# Patient Record
Sex: FEMALE | Race: WHITE | URBAN - METROPOLITAN AREA
[De-identification: names, ages, dates, MRNs, and addresses within clinical notes are randomized per-mention and may not be internally consistent; named-entity substitution may affect disease eponyms.]

---

## 2017-02-01 ENCOUNTER — TELEPHONE (OUTPATIENT)
Dept: FAMILY MEDICINE CLINIC | Age: 47
End: 2017-02-01

## 2017-02-01 ENCOUNTER — OFFICE VISIT (OUTPATIENT)
Dept: FAMILY MEDICINE CLINIC | Age: 47
End: 2017-02-01

## 2017-02-01 VITALS
SYSTOLIC BLOOD PRESSURE: 138 MMHG | TEMPERATURE: 98.2 F | DIASTOLIC BLOOD PRESSURE: 94 MMHG | BODY MASS INDEX: 23.74 KG/M2 | HEIGHT: 63 IN | HEART RATE: 87 BPM | RESPIRATION RATE: 16 BRPM | OXYGEN SATURATION: 99 % | WEIGHT: 134 LBS

## 2017-02-01 DIAGNOSIS — I10 ESSENTIAL HYPERTENSION: ICD-10-CM

## 2017-02-01 DIAGNOSIS — E87.1 HYPONATREMIA: ICD-10-CM

## 2017-02-01 DIAGNOSIS — R60.9 PERIPHERAL EDEMA: ICD-10-CM

## 2017-02-01 DIAGNOSIS — D50.8 OTHER IRON DEFICIENCY ANEMIA: ICD-10-CM

## 2017-02-01 DIAGNOSIS — K70.30 ALCOHOLIC CIRRHOSIS OF LIVER WITHOUT ASCITES (HCC): ICD-10-CM

## 2017-02-01 RX ORDER — BUMETANIDE 1 MG/1
1 TABLET ORAL DAILY
Qty: 30 TAB | Refills: 3 | Status: SHIPPED | OUTPATIENT
Start: 2017-02-01 | End: 2017-06-11 | Stop reason: SDUPTHER

## 2017-02-01 RX ORDER — FOLIC ACID 1 MG/1
1 TABLET ORAL DAILY
Qty: 30 TAB | Refills: 6 | Status: SHIPPED | OUTPATIENT
Start: 2017-02-01

## 2017-02-01 RX ORDER — FOLIC ACID 1 MG/1
TABLET ORAL DAILY
COMMUNITY
End: 2017-02-01 | Stop reason: SDUPTHER

## 2017-02-01 RX ORDER — METOPROLOL TARTRATE 50 MG/1
TABLET ORAL 2 TIMES DAILY
COMMUNITY
End: 2017-02-01 | Stop reason: SDUPTHER

## 2017-02-01 RX ORDER — LANOLIN ALCOHOL/MO/W.PET/CERES
1000 CREAM (GRAM) TOPICAL DAILY
Qty: 30 TAB | Refills: 6 | Status: SHIPPED | OUTPATIENT
Start: 2017-02-01

## 2017-02-01 RX ORDER — LISINOPRIL 20 MG/1
20 TABLET ORAL DAILY
Qty: 30 TAB | Refills: 4 | Status: SHIPPED | OUTPATIENT
Start: 2017-02-01 | End: 2017-02-08

## 2017-02-01 RX ORDER — LANCETS
EACH MISCELLANEOUS
Qty: 400 EACH | Refills: 11 | Status: SHIPPED | OUTPATIENT
Start: 2017-02-01 | End: 2017-05-16 | Stop reason: SDUPTHER

## 2017-02-01 RX ORDER — METOPROLOL TARTRATE 50 MG/1
50 TABLET ORAL 2 TIMES DAILY
Qty: 60 TAB | Refills: 6 | Status: SHIPPED | OUTPATIENT
Start: 2017-02-01 | End: 2017-02-08 | Stop reason: ALTCHOICE

## 2017-02-01 RX ORDER — LANOLIN ALCOHOL/MO/W.PET/CERES
CREAM (GRAM) TOPICAL
COMMUNITY

## 2017-02-01 RX ORDER — BUMETANIDE 1 MG/1
TABLET ORAL DAILY
COMMUNITY
End: 2017-02-01 | Stop reason: SDUPTHER

## 2017-02-01 RX ORDER — INSULIN PUMP SYRINGE, 3 ML
EACH MISCELLANEOUS
Qty: 1 KIT | Refills: 0 | Status: SHIPPED | OUTPATIENT
Start: 2017-02-01 | End: 2017-02-03 | Stop reason: SDUPTHER

## 2017-02-01 RX ORDER — ACETAMINOPHEN 160 MG/5ML
SUSPENSION, ORAL (FINAL DOSE FORM) ORAL
Qty: 100 SYRINGE | Refills: 3 | Status: SHIPPED | OUTPATIENT
Start: 2017-02-01 | End: 2017-05-16 | Stop reason: SDUPTHER

## 2017-02-01 NOTE — TELEPHONE ENCOUNTER
The patient is calling to request a blood glucose monitor and the test strips so she can check her blood sugars; the patient has [employer]; the patient states they were checking her blood sugars while she was in the hospital; the patient states she uses [facility] [address]; the best contact number for the patient is [phone]; thank you

## 2017-02-01 NOTE — PROGRESS NOTES
1. Have you been to the ER, urgent care clinic since your last visit? Hospitalized since your last visit? No    2. Have you seen or consulted any other health care providers outside of the [address] [place] [address] since your last visit? Include any pap smears or colon screening.  No     Chief Complaint   Patient presents with    Establish Care    Medication Refill    Diabetes    CHF    Anemia    Referral Request

## 2017-02-01 NOTE — PROGRESS NOTES
Chief Complaint   Patient presents with    Establish Care    Medication Refill    Diabetes    CHF    Anemia    Referral Request     she is a 52y.o. year old female who presents for evalution. She was released from [facility] jh 15 2017  She went in for a cut that got infected with MRSA. She went back in for SOB a few days later and she at that time had pneumonia. While in the hospital she was told that she has CHF  Her hgb at that time was also 6.7. She was seen by hematology and the hgb had increased so he decided she did not need a  Big workup  She has  Been drinking heavy etoh but stopped in December. She was told the liver looks mildly cirrhotic. This is possibly the cause for the anemia  She is taking iron but not taking it three times a day    Reviewed PmHx, RxHx, FmHx, SocHx, AllgHx and updated and dated in the chart.     Patient Active Problem List    Diagnosis    Peripheral neuropathy ([facility]    Essential hypertension    Type II diabetes mellitus, uncontrolled ([facility]       Nurse notes were reviewed and copied and are correct  Review of Systems - negative except as listed above in the HPI    Objective:     Vitals:    02/01/17 1339   BP: (!) 138/94   Pulse: 87   Resp: 16   Temp: 98.2 °F (36.8 °C)   TempSrc: Oral   SpO2: 99%   Weight: 134 lb (60.8 kg)   Height: 5' 3\" (1.6 m)   Physical Examination: General appearance - alert, well appearing, and in no distress and oriented to person, place, and time  Mental status - alert, oriented to person, place, and time  Mouth - mucous membranes moist, pharynx normal without lesions  Neck - supple, no significant adenopathy  Lymphatics - no palpable lymphadenopathy, no hepatosplenomegaly  Chest - clear to auscultation, no wheezes, rales or rhonchi, symmetric air entry  Heart - normal rate, regular rhythm, normal S1, S2, no murmurs, rubs, clicks or gallops  Abdomen - soft,nondistended, no masses or organomegaly  Neurological - alert, oriented, normal speech, no focal findings or movement disorder noted  Musculoskeletal - no joint tenderness, deformity or swelling  Extremities - peripheral pulses normal, no pedal edema, no clubbing or cyanosis  Skin - normal coloration and turgor, no rashes, no suspicious skin lesions noted      Assessment/ Plan:   Jennie Garcia was seen today for establish care, medication refill, diabetes, chf, anemia and referral request.    Diagnoses and all orders for this visit:    Uncontrolled type 2 diabetes mellitus with other specified complication, with long-term current use of insulin (HCC)  -     Insulin Syringe-Needle U-100 0.3 mL 30 gauge x 1/2\" syrg; Use as directed    Other iron deficiency anemia  -     CBC WITH AUTOMATED DIFF  -     VITAMIN B12  -     FOLATE  -     REFERRAL TO GASTROENTEROLOGY  -     cyanocobalamin (VITAMIN B-12) 1,000 mcg tablet; Take 1 Tab by mouth daily. -     folic acid (FOLVITE) 1 mg tablet; Take 1 Tab by mouth daily. This could be from etoh causing a b12 deficiency or from bleeding in the stomach as a result of etoh. I am referring to GI to check her for bleeding n the GI track as well as follow  Her for the cirrhosis  Alcoholic cirrhosis of liver without ascites ([facility]  -     METABOLIC PANEL, COMPREHENSIVE  -     REFERRAL TO GASTROENTEROLOGY    Essential hypertension  -     lisinopril (PRINIVIL, ZESTRIL) 20 mg tablet; Take 1 Tab by mouth daily. Not well controlled. Recheck in 1 week  Peripheral edema  -     bumetanide (BUMEX) 1 mg tablet; Take 1 Tab by mouth daily. Follow-up Disposition:  Return in about 1 week (around 2/8/2017). ICD-10-CM ICD-9-CM    1. Uncontrolled type 2 diabetes mellitus with other specified complication, with long-term current use of insulin (HCC) E11.69  Insulin Syringe-Needle U-100 0.3 mL 30 gauge x 1/2\" syrg    E11.65      Z79.4     2.  Other iron deficiency anemia D50.8  CBC WITH AUTOMATED DIFF      VITAMIN B12      FOLATE      REFERRAL TO GASTROENTEROLOGY cyanocobalamin (VITAMIN B-12) 1,000 mcg tablet      folic acid (FOLVITE) 1 mg tablet   3. Alcoholic cirrhosis of liver without ascites (HCC) U94.63 769.7 METABOLIC PANEL, COMPREHENSIVE      REFERRAL TO GASTROENTEROLOGY   4. Essential hypertension I10 401.9 lisinopril (PRINIVIL, ZESTRIL) 20 mg tablet   5. Peripheral edema R60.9 782.3 bumetanide (BUMEX) 1 mg tablet       I have discussed the diagnosis with the patient and the intended plan as seen in the above orders. The patient has received an after-visit summary and questions were answered concerning future plans. Medication Side Effects and Warnings were discussed with patient: yes  Patient Labs were reviewed and or requested: yes  Patient Past Records were reviewed and or requested: yes        There are no Patient Instructions on file for this visit.     The patient verbalizes understanding and agrees with the plan of care        Patient has the advanced directives booklet to review

## 2017-02-01 NOTE — MR AVS SNAPSHOT
Visit Information Date & Time Provider Department Dept. Phone Encounter #  
 2/1/2017  1:30 PM Justin Braxton MD [address] [facility] [id number] Follow-up Instructions Return in about 1 week (around 2/8/2017). Your Appointments 2/13/2017  3:30 PM  
ROUTINE CARE with Sary Khna MD  
[facility]) Appt Note: fu DM  
 [address] [facility] [address]  
[phone]  
  
   
 [address] [facility] [address] Upcoming Health Maintenance Date Due  
 LIPID PANEL Q1 1970 FOOT EXAM Q1 1/2/1980 MICROALBUMIN Q1 1/2/1980 EYE EXAM RETINAL OR DILATED Q1 1/2/1980 Pneumococcal 19-64 Medium Risk (1 of 1 - PPSV23) 1/2/1989 DTaP/Tdap/Td series (1 - Tdap) 1/2/1991 PAP AKA CERVICAL CYTOLOGY 1/2/1991 HEMOGLOBIN A1C Q6M 6/7/2016 INFLUENZA AGE 9 TO ADULT 8/1/2016 Allergies as of 2/1/2017  Review Complete On: 2/1/2017 By: Justin Braxton MD  
  
 Severity Noted Reaction Type Reactions Other Food  12/07/2015    Swelling Roetta Player Sulfa (Sulfonamide Antibiotics)  05/27/2011    Hives Current Immunizations  Never Reviewed No immunizations on file. Not reviewed this visit You Were Diagnosed With   
  
 Codes Comments Uncontrolled type 2 diabetes mellitus with other specified complication, with long-term current use of insulin ([facility]    -  Primary ICD-10-CM: E11.69, E11.65, Z79.4 Other iron deficiency anemia     ICD-10-CM: D50.8 Alcoholic cirrhosis of liver without ascites (HCC)     ICD-10-CM: K70.30 ICD-9-CM: 571.2 Essential hypertension     ICD-10-CM: I10 
ICD-9-CM: 401.9 Peripheral edema     ICD-10-CM: R60.9 ICD-9-CM: 475. 3 Vitals BP Pulse Temp Resp Height(growth percentile) Weight(growth percentile)  (!) 138/94 (BP 1 Location: Right arm, BP Patient Position: Sitting) 87 98.2 °F (36.8 °C) (Oral) 16 5' 3\" (1.6 m) 134 lb (60.8 kg) SpO2 BMI OB Status Smoking Status 99% 23.74 kg/m2 Having regular periods Former Smoker BMI and BSA Data Body Mass Index Body Surface Area  
 23.74 kg/m 2 1.64 m 2 Preferred Pharmacy Pharmacy Name Phone [facility] #[id number]- [facility] [address] [facility] [phone] Your Updated Medication List  
  
   
This list is accurate as of: 2/1/17  2:34 PM.  Always use your most recent med list.  
  
  
  
  
 Blood-Glucose Meter monitoring kit Commonly known as:  FREESTYLE LITE METER Use to test blood glucose 4 times daily  
  
 bumetanide 1 mg tablet Commonly known as:  Barnie Pulse Take 1 Tab by mouth daily. CLARITIN 10 mg tablet Generic drug:  loratadine Take 10 mg by mouth daily. cyanocobalamin 1,000 mcg tablet Commonly known as:  VITAMIN B-12 Take 1 Tab by mouth daily. ferrous sulfate 325 mg (65 mg iron) tablet Take  by mouth Daily (before breakfast). folic acid 1 mg tablet Commonly known as:  Google Take 1 Tab by mouth daily. * glucose blood VI test strips strip Commonly known as:  ONETOUCH ULTRA TEST Test blood glucose 4 times daily * glucose blood VI test strips strip Commonly known as:  FREESTYLE LITE STRIPS Use to test blood glucose 4 times daily * HUMALOG SC  
by SubCUTAneous route. Use per SSI * insulin lispro 100 unit/mL kwikpen Commonly known as:  HUMALOG 4- 5 units before breakfast, 4-5 units before lunch and 4-5 units before dinner w/ SSI verbal received from Buddy Muniz MD to dispense * insulin lispro 100 unit/mL kwikpen Commonly known as:  HUMALOG Inject 4- 5  units before breakfast, 4-5 units before lunch and 4-5 units before dinner w/ SSI Max units daily: 30  
  
 * insulin lispro 100 unit/mL injection Commonly known as:  HumaLOG Inject 4- 5 units before breakfast, 4-5 units before lunch and 4-5 units before dinner. Max Daily Units: 30  
  
 * insulin lispro 100 unit/mL injection Commonly known as:  HumaLOG Inject 4- 5 units before breakfast, 4-5 units before lunch and 4-5 units before dinner. Max daily units: 30  
  
 * insulin lispro 100 unit/mL injection Commonly known as:  HumaLOG Inject 4- 5 units before breakfast, 4-5 units before lunch and 4-5 units before dinner. Max daily units: 30  
  
 insulin detemir 100 unit/mL (3 mL) Inpn Commonly known as:  Derek Socorro Inject 16 units at bed time Insulin Needles (Disposable) 32 gauge x 5/32\" Ndle Commonly known as:  Randee Pen Needle Use with humalog and Levemir Insulin Syringe-Needle U-100 0.3 mL 30 gauge x 1/2\" Syrg Use as directed * Lancets Misc Test blood glucose 4 times daily * Lancets Misc Commonly known as:  FREESTYLE LANCETS Use to test blood glucose 4 times daily  
  
 lisinopril 20 mg tablet Commonly known as:  Mollie Ripa Take 1 Tab by mouth daily. metoprolol tartrate 50 mg tablet Commonly known as:  LOPRESSOR Take  by mouth two (2) times a day. MULTIVITAMIN PO Take 1 Tab by mouth daily. OTHER  
2 Tabs two (2) times a day. Hista-Block/Blood Pressure * Notice: This list has 10 medication(s) that are the same as other medications prescribed for you. Read the directions carefully, and ask your doctor or other care provider to review them with you. Prescriptions Sent to Pharmacy Refills Insulin Syringe-Needle U-100 0.3 mL 30 gauge x 1/2\" syrg 3 Sig: Use as directed Class: Normal  
 Pharmacy: [facility]/pharmacy #[id number] [address] Ph #: [phone]  
 cyanocobalamin (VITAMIN B-12) 1,000 mcg tablet 6 Sig: Take 1 Tab by mouth daily. Class: Normal  
 Pharmacy: [address] Ph #: [phone]  Route: Oral  
 bumetanide (BUMEX) 1 mg tablet 3  
 Sig: Take 1 Tab by mouth daily. Class: Normal  
 Pharmacy: [address] [place] [address] Ph #: [phone] Route: Oral  
 folic acid (FOLVITE) 1 mg tablet 6 Sig: Take 1 Tab by mouth daily. Class: Normal  
 Pharmacy: [address] [place] [address] Ph #: [phone] Route: Oral  
 lisinopril (PRINIVIL, ZESTRIL) 20 mg tablet 4 Sig: Take 1 Tab by mouth daily. Class: Normal  
 Pharmacy: [address] [place] [address] Ph #: [phone] Route: Oral  
  
We Performed the Following CBC WITH AUTOMATED DIFF [76428 CPT(R)] FOLATE [id number] CPT(R)] METABOLIC PANEL, COMPREHENSIVE [12036 CPT(R)] REFERRAL TO GASTROENTEROLOGY [LDI08 Custom] Comments:  
 Recent severe anemia, needs GI eval for possible source of blood loss VITAMIN B12 [id number] CPT(R)] Follow-up Instructions Return in about 1 week (around 2/8/2017). Referral Information Referral ID Referred By Referred To  
  
 [id number] [facility], [facility] Phone: [phone] Fax: [phone] Visits Status Start Date End Date 1 New Request 2/1/17 2/1/18 If your referral has a status of pending review or denied, additional information will be sent to support the outcome of this decision. Introducing [facility]! Jovi Esquivel introduces [employer] patient portal. Now you can access parts of your medical record, email your doctor's office, and request medication refills online. 1. In your internet browser, go to [email or web address] 2. Click on the First Time User? Click Here link in the Sign In box. You will see the New Member Sign Up page. 3. Enter your [employer] Access Code exactly as it appears below. You will not need to use this code after youve completed the sign-up process.  If you do not sign up before the expiration date, you must request a new code. · [employer] Computer Access Code: [email or web address] Expires: 5/2/2017  2:34 PM 
 
4. Enter the last four digits of your Social Security Number (xxxx) and Date of Birth (mm/dd/yyyy) as indicated and click Submit. You will be taken to the next sign-up page. 5. Create a [employer] Computer ID. This will be your [employer] Computer login ID and cannot be changed, so think of one that is secure and easy to remember. 6. Create a [employer] Computer password. You can change your password at any time. 7. Enter your Password Reset Question and Answer. This can be used at a later time if you forget your password. 8. Enter your e-mail address. You will receive e-mail notification when new information is available in [address] 9. Click Sign Up. You can now view and download portions of your medical record. 10. Click the Download Summary menu link to download a portable copy of your medical information. If you have questions, please visit the Frequently Asked Questions section of the [employer] website. Remember, [employer] is NOT to be used for urgent needs. For medical emergencies, dial 911. Now available from your iPhone and Android! Please provide this summary of care documentation to your next provider. Your primary care clinician is listed as Nehemiah Greene. If you have any questions after today's visit, please call [phone].

## 2017-02-02 NOTE — TELEPHONE ENCOUNTER
Pt called at the number on file. Nurse informed the pt, the medication that was requested, was approved and sent to the pharmacy, pt verbalize understanding.

## 2017-02-02 NOTE — TELEPHONE ENCOUNTER
----- Message from Loida Ardon sent at 2/1/2017  5:11 PM EST -----  Regarding: Dr Usama Hernández  Pt's (p) [phone], regarding her rx  For her blood glucose strips that was supposed to be called into  [facility]  [phone],  She checking on the status

## 2017-02-03 LAB
ALBUMIN SERPL-MCNC: 4 G/DL (ref 3.5–5.5)
ALBUMIN/GLOB SERPL: 1.3 {RATIO} (ref 1.1–2.5)
ALP SERPL-CCNC: 119 IU/L (ref 39–117)
ALT SERPL-CCNC: 15 IU/L (ref 0–32)
AST SERPL-CCNC: 19 IU/L (ref 0–40)
BASOPHILS # BLD AUTO: 0.1 X10E3/UL (ref 0–0.2)
BASOPHILS NFR BLD AUTO: 1 %
BILIRUB SERPL-MCNC: 1.2 MG/DL (ref 0–1.2)
BUN SERPL-MCNC: 26 MG/DL (ref 6–24)
BUN/CREAT SERPL: 17 (ref 9–23)
CALCIUM SERPL-MCNC: 10.7 MG/DL (ref 8.7–10.2)
CHLORIDE SERPL-SCNC: 85 MMOL/L (ref 96–106)
CO2 SERPL-SCNC: 27 MMOL/L (ref 18–29)
CREAT SERPL-MCNC: 1.49 MG/DL (ref 0.57–1)
EOSINOPHIL # BLD AUTO: 0.1 X10E3/UL (ref 0–0.4)
EOSINOPHIL NFR BLD AUTO: 2 %
ERYTHROCYTE [DISTWIDTH] IN BLOOD BY AUTOMATED COUNT: 18 % (ref 12.3–15.4)
FOLATE SERPL-MCNC: 16.6 NG/ML
GLOBULIN SER CALC-MCNC: 3.1 G/DL (ref 1.5–4.5)
GLUCOSE SERPL-MCNC: 431 MG/DL (ref 65–99)
HCT VFR BLD AUTO: 40.7 % (ref 34–46.6)
HGB BLD-MCNC: 13 G/DL (ref 11.1–15.9)
IMM GRANULOCYTES # BLD: 0 X10E3/UL (ref 0–0.1)
IMM GRANULOCYTES NFR BLD: 0 %
INTERPRETATION: NORMAL
LYMPHOCYTES # BLD AUTO: 1.6 X10E3/UL (ref 0.7–3.1)
LYMPHOCYTES NFR BLD AUTO: 25 %
MCH RBC QN AUTO: 25.9 PG (ref 26.6–33)
MCHC RBC AUTO-ENTMCNC: 31.9 G/DL (ref 31.5–35.7)
MCV RBC AUTO: 81 FL (ref 79–97)
MONOCYTES # BLD AUTO: 0.5 X10E3/UL (ref 0.1–0.9)
MONOCYTES NFR BLD AUTO: 7 %
NEUTROPHILS # BLD AUTO: 4 X10E3/UL (ref 1.4–7)
NEUTROPHILS NFR BLD AUTO: 65 %
PLATELET # BLD AUTO: 310 X10E3/UL (ref 150–379)
POTASSIUM SERPL-SCNC: 4.9 MMOL/L (ref 3.5–5.2)
PROT SERPL-MCNC: 7.1 G/DL (ref 6–8.5)
RBC # BLD AUTO: 5.02 X10E6/UL (ref 3.77–5.28)
SODIUM SERPL-SCNC: 133 MMOL/L (ref 134–144)
VIT B12 SERPL-MCNC: 1014 PG/ML (ref 211–946)
WBC # BLD AUTO: 6.3 X10E3/UL (ref 3.4–10.8)

## 2017-02-04 RX ORDER — INSULIN PUMP SYRINGE, 3 ML
EACH MISCELLANEOUS
Qty: 1 KIT | Refills: 0 | Status: SHIPPED | OUTPATIENT
Start: 2017-02-03

## 2017-02-04 RX ORDER — LANCETS
EACH MISCELLANEOUS
Qty: 200 EACH | Refills: 11 | Status: SHIPPED | OUTPATIENT
Start: 2017-02-04 | End: 2017-02-13 | Stop reason: SDUPTHER

## 2017-02-06 ENCOUNTER — TELEPHONE (OUTPATIENT)
Dept: FAMILY MEDICINE CLINIC | Age: 47
End: 2017-02-06

## 2017-02-06 NOTE — PROGRESS NOTES
Spoke with patient and notified of lab results. Patient has an appt Wednesday 2/8/17 and will recheck then. Patient verbalized understanding and had no questions at this time.

## 2017-02-06 NOTE — PROGRESS NOTES
i need her to come back in today to recheck the BMP. The blood sugar was extremely high and because of the high sugar the sodium was low . I need to see where the number are now. This is very important. you need to come in right away to recheck

## 2017-02-06 NOTE — TELEPHONE ENCOUNTER
Patient at her pulmonary apt right now today 2.6.17 and she needs the referral in the system in order for me to get the PA

## 2017-02-08 ENCOUNTER — OFFICE VISIT (OUTPATIENT)
Dept: FAMILY MEDICINE CLINIC | Age: 47
End: 2017-02-08

## 2017-02-08 ENCOUNTER — TELEPHONE (OUTPATIENT)
Dept: FAMILY MEDICINE CLINIC | Age: 47
End: 2017-02-08

## 2017-02-08 VITALS
SYSTOLIC BLOOD PRESSURE: 147 MMHG | DIASTOLIC BLOOD PRESSURE: 89 MMHG | OXYGEN SATURATION: 97 % | BODY MASS INDEX: 23.74 KG/M2 | HEART RATE: 95 BPM | TEMPERATURE: 98 F | WEIGHT: 134 LBS | RESPIRATION RATE: 17 BRPM | HEIGHT: 63 IN

## 2017-02-08 DIAGNOSIS — I50.9 ACUTE ON CHRONIC CONGESTIVE HEART FAILURE, UNSPECIFIED CONGESTIVE HEART FAILURE TYPE: Primary | ICD-10-CM

## 2017-02-08 DIAGNOSIS — F41.9 ANXIETY: ICD-10-CM

## 2017-02-08 DIAGNOSIS — I10 ESSENTIAL HYPERTENSION: Primary | ICD-10-CM

## 2017-02-08 LAB — GLUCOSE POC: 409 MG/DL

## 2017-02-08 RX ORDER — BUSPIRONE HYDROCHLORIDE 5 MG/1
5 TABLET ORAL
Qty: 30 TAB | Refills: 0 | Status: SHIPPED | OUTPATIENT
Start: 2017-02-08

## 2017-02-08 RX ORDER — METOPROLOL TARTRATE 25 MG/1
12.5 TABLET, FILM COATED ORAL 2 TIMES DAILY
Qty: 30 TAB | Refills: 1 | Status: SHIPPED | OUTPATIENT
Start: 2017-02-08

## 2017-02-08 NOTE — TELEPHONE ENCOUNTER
The patient states she needs a referral to cardiology for congestive heart failure and pulmonary edema; the patient is scheduled to be seen tomorrow by Dr. Ophelia Waddell with [facility]; her appointment is at 2:15 pm tomorrow; if Dr. Jonel Carvalho will put the referral in the system, I will do the insurance referral; apparently this is a follow up from the hospital; thank you

## 2017-02-08 NOTE — PROGRESS NOTES
1. Have you been to the ER, urgent care clinic since your last visit? Hospitalized since your last visit? No    2. Have you seen or consulted any other health care providers outside of the [address] [place] [address] since your last visit? Include any pap smears or colon screening.  No

## 2017-02-08 NOTE — PROGRESS NOTES
No chief complaint on file. she is a 52y.o. year old female who presents for evalution. . She had very high blood sugar last week and sodium was low. She also has cirrhosis and taking diuretic  She never started the lisinopril  She was taking metoprolol but stopped because her bp was too low a few times and she felt bad  Reviewed PmHx, RxHx, FmHx, SocHx, AllgHx and updated and dated in the chart. Patient Active Problem List    Diagnosis    [facility]    Essential hypertension    Type II diabetes mellitus, uncontrolled ([facility]       Nurse notes were reviewed and copied and are correct  Review of Systems - negative except as listed above in the HPI    Objective:     Vitals:    02/08/17 1453   BP: 147/89   Pulse: 95   Resp: 17   Temp: 98 °F (36.7 °C)   TempSrc: Oral   SpO2: 97%   Weight: 134 lb (60.8 kg)   Height: 5' 3\" (1.6 m)        Physical Examination: General appearance - alert, well appearing, and in no distress and oriented to person, place, and time  Mental status - alert, oriented to person, place, and time, normal mood, behavior, speech, dress, motor activity, and thought processes  Lymphatics - no palpable lymphadenopathy, no hepatosplenomegaly  Chest - clear to auscultation, no wheezes, rales or rhonchi, symmetric air entry  Heart - normal rate, regular rhythm, normal S1, S2, no murmurs, rubs, clicks or gallops  Abdomen - soft, nontender, nondistended, no masses or organomegaly      Assessment/ Plan:   Diagnoses and all orders for this visit:    Essential hypertension  -     metoprolol tartrate (LOPRESSOR) 25 mg tablet; Take 0.5 Tabs by mouth two (2) times a day. Lowering the dose. She is c/o feeling lightheaded and feels better when not taking it. She has not taken any today. I am lowering it to 12.5 mg bid. Anxiety  -     busPIRone (BUSPAR) 5 mg tablet; Take 1 Tab by mouth nightly.     Uncontrolled type 2 diabetes mellitus with other specified complication (HCC)  -     AMB POC GLUCOSE BLOOD, BY GLUCOSE MONITORING DEVICE    Still at 400. She is drinking glucerna. Maybe this is causing the iincreased glucose. The other diet history sounds llike low carb. She will see endocrine Monday. Meanwhile move more in the house and outside a little as tolerated. Continue the insulin as prescribed. Recheck the BMP today and she will see cardiology tomorrow . She will let the cardiologist know about the test when she sees him   Follow-up Disposition:  Return in about 3 months (around 5/8/2017). ICD-10-CM ICD-9-CM    1. Essential hypertension I10 401.9 metoprolol tartrate (LOPRESSOR) 25 mg tablet   2. Anxiety F41.9 300.00 busPIRone (BUSPAR) 5 mg tablet   3. Uncontrolled type 2 diabetes mellitus with other specified complication (HCC) H42.36  AMB POC GLUCOSE BLOOD, BY GLUCOSE MONITORING DEVICE    E11.65         I have discussed the diagnosis with the patient and the intended plan as seen in the above orders. The patient has received an after-visit summary and questions were answered concerning future plans. Medication Side Effects and Warnings were discussed with patient: yes  Patient Labs were reviewed and or requested: yes  Patient Past Records were reviewed and or requested: yes        There are no Patient Instructions on file for this visit.     The patient verbalizes understanding and agrees with the plan of care        Patient has the advanced directives booklet to review

## 2017-02-08 NOTE — MR AVS SNAPSHOT
Visit Information Date & Time Provider Department Dept. Phone Encounter #  
 2/8/2017  2:30 PM Justin Braxton MD [address] [facility] [id number] Follow-up Instructions Return in about 3 months (around 5/8/2017). Your Appointments 2/13/2017  3:30 PM  
ROUTINE CARE with Sary Khan MD  
Care Diabetes & Endocrinology [facility]) Appt Note: fu DM  
 [address] [facility] [address]  
[phone] [address] [facility] [address] Upcoming Health Maintenance Date Due  
 LIPID PANEL Q1 1970 FOOT EXAM Q1 1/2/1980 MICROALBUMIN Q1 1/2/1980 EYE EXAM RETINAL OR DILATED Q1 1/2/1980 Pneumococcal 19-64 Medium Risk (1 of 1 - PPSV23) 1/2/1989 DTaP/Tdap/Td series (1 - Tdap) 1/2/1991 PAP AKA CERVICAL CYTOLOGY 1/2/1991 HEMOGLOBIN A1C Q6M 6/7/2016 INFLUENZA AGE 9 TO ADULT 8/1/2016 Allergies as of 2/8/2017  Review Complete On: 2/8/2017 By: Art Ulloa LPN Severity Noted Reaction Type Reactions Other Food  12/07/2015    Swelling Roetta Player Sulfa (Sulfonamide Antibiotics)  05/27/2011    Hives Current Immunizations  Never Reviewed No immunizations on file. Not reviewed this visit You Were Diagnosed With   
  
 Codes Comments Essential hypertension    -  Primary ICD-10-CM: I10 
ICD-9-CM: 401.9 Uncontrolled type 2 diabetes mellitus with other specified complication, without long-term current use of insulin (HCC)     ICD-10-CM: E11.69, E11.65 Vitals BP Pulse Temp Resp Height(growth percentile) Weight(growth percentile) 147/89 95 98 °F (36.7 °C) (Oral) 17 5' 3\" (1.6 m) 134 lb (60.8 kg) SpO2 BMI OB Status Smoking Status 97% 23.74 kg/m2 Having regular periods Former Smoker Vitals History BMI and BSA Data Body Mass Index Body Surface Area  
 23.74 kg/m 2 1.64 m 2 Preferred Pharmacy Pharmacy Name Phone [facility]/PH[facility]ACY #[id number] [phone] Your Updated Medication List  
  
   
This list is accurate as of: 2/8/17  3:19 PM.  Always use your most recent med list.  
  
  
  
  
 Blood-Glucose Meter monitoring kit Commonly known as:  FREESTYLE LITE METER Use to test blood glucose 4 times daily  
  
 bumetanide 1 mg tablet Commonly known as:  Mariela [place] Take 1 Tab by mouth daily. CLARITIN 10 mg tablet Generic drug:  loratadine Take 10 mg by mouth daily. cyanocobalamin 1,000 mcg tablet Commonly known as:  VITAMIN B-12 Take 1 Tab by mouth daily. ferrous sulfate 325 mg (65 mg iron) tablet Take  by mouth Daily (before breakfast). folic acid 1 mg tablet Commonly known as:  Google Take 1 Tab by mouth daily. * glucose blood VI test strips strip Commonly known as:  ONETOUCH ULTRA TEST Test blood glucose 4 times daily * glucose blood VI test strips strip Commonly known as:  FREESTYLE LITE STRIPS Use to test blood glucose 4 times daily * HUMALOG SC  
by SubCUTAneous route. Use per SSI * insulin lispro 100 unit/mL kwikpen Commonly known as:  HUMALOG 4- 5 units before breakfast, 4-5 units before lunch and 4-5 units before dinner w/ SSI verbal received from Thelma Johnson MD to dispense * insulin lispro 100 unit/mL kwikpen Commonly known as:  HUMALOG Inject 4- 5  units before breakfast, 4-5 units before lunch and 4-5 units before dinner w/ SSI Max units daily: 30  
  
 * insulin lispro 100 unit/mL injection Commonly known as:  HumaLOG Inject 4- 5 units before breakfast, 4-5 units before lunch and 4-5 units before dinner. Max Daily Units: 30  
  
 * insulin lispro 100 unit/mL injection Commonly known as:  HumaLOG Inject 4- 5 units before breakfast, 4-5 units before lunch and 4-5 units before dinner. Max daily units: 30  
  
 * insulin lispro 100 unit/mL injection Commonly known as:  HumaLOG Inject 4- 5 units before breakfast, 4-5 units before lunch and 4-5 units before dinner. Max daily units: 30  
  
 insulin detemir 100 unit/mL (3 mL) Inpn Commonly known as:  Derek Faulkner Inject 16 units at bed time Insulin Needles (Disposable) 32 gauge x 5/32\" Ndle Commonly known as:  Randee Pen Needle Use with humalog and Levemir Insulin Syringe-Needle U-100 0.3 mL 30 gauge x 1/2\" Syrg Use as directed * Lancets Misc Use to test blood glucose 4 times daily * Lancets Misc Test blood glucose 4 times daily  
  
 metoprolol tartrate 25 mg tablet Commonly known as:  LOPRESSOR Take 0.5 Tabs by mouth two (2) times a day. MULTIVITAMIN PO Take 1 Tab by mouth daily. OTHER  
2 Tabs two (2) times a day. Hista-Block/Blood Pressure * Notice: This list has 10 medication(s) that are the same as other medications prescribed for you. Read the directions carefully, and ask your doctor or other care provider to review them with you. Prescriptions Sent to Pharmacy Refills  
 metoprolol tartrate (LOPRESSOR) 25 mg tablet 1 Sig: Take 0.5 Tabs by mouth two (2) times a day. Class: Normal  
 Pharmacy: [address] [place] [address], [address] [place] [address] Ph #: [phone] Route: Oral  
  
We Performed the Following AMB POC GLUCOSE BLOOD, BY GLUCOSE MONITORING DEVICE [92865 CPT(R)] Follow-up Instructions Return in about 3 months (around 5/8/2017). Introducing [facility] & HEALTH SERVICES! Jayla Shell introduces [employer] patient portal. Now you can access parts of your medical record, email your doctor's office, and request medication refills online. 1. In your internet browser, go to [email or web address] 2. Click on the First Time User? Click Here link in the Sign In box. You will see the New Member Sign Up page. 3. Enter your [employer] Access Code exactly as it appears below.  You will not need to use this code after youve completed the sign-up process. If you do not sign up before the expiration date, you must request a new code. · Privepass Access Code: [email or web address] Expires: 5/2/2017  2:34 PM 
 
4. Enter the last four digits of your Social Security Number (xxxx) and Date of Birth (mm/dd/yyyy) as indicated and click Submit. You will be taken to the next sign-up page. 5. Create a Privepass ID. This will be your Privepass login ID and cannot be changed, so think of one that is secure and easy to remember. 6. Create a Privepass password. You can change your password at any time. 7. Enter your Password Reset Question and Answer. This can be used at a later time if you forget your password. 8. Enter your e-mail address. You will receive e-mail notification when new information is available in [address] 9. Click Sign Up. You can now view and download portions of your medical record. 10. Click the Download Summary menu link to download a portable copy of your medical information. If you have questions, please visit the Frequently Asked Questions section of the Prive[employer] website. Remember, Privepass is NOT to be used for urgent needs. For medical emergencies, dial 911. Now available from your iPhone and Android! Please provide this summary of care documentation to your next provider. Your primary care clinician is listed as Barb Chavez. If you have any questions after today's visit, please call [phone].

## 2017-02-08 NOTE — TELEPHONE ENCOUNTER
MD Prema Hoyos LPN        Caller: Unspecified (2 days ago,  2:18 PM)                     She has an appt on the 8th, we will discuss it then       She saw Dr. Theresa Still.

## 2017-02-09 ENCOUNTER — TELEPHONE (OUTPATIENT)
Dept: FAMILY MEDICINE CLINIC | Age: 47
End: 2017-02-09

## 2017-02-09 DIAGNOSIS — K70.30 ALCOHOLIC CIRRHOSIS OF LIVER WITHOUT ASCITES (HCC): ICD-10-CM

## 2017-02-09 LAB
BUN SERPL-MCNC: 23 MG/DL (ref 6–24)
BUN/CREAT SERPL: 21 (ref 9–23)
CALCIUM SERPL-MCNC: 10.4 MG/DL (ref 8.7–10.2)
CHLORIDE SERPL-SCNC: 95 MMOL/L (ref 96–106)
CO2 SERPL-SCNC: 22 MMOL/L (ref 18–29)
CREAT SERPL-MCNC: 1.08 MG/DL (ref 0.57–1)
GLUCOSE SERPL-MCNC: 384 MG/DL (ref 65–99)
POTASSIUM SERPL-SCNC: 5.1 MMOL/L (ref 3.5–5.2)
SODIUM SERPL-SCNC: 134 MMOL/L (ref 134–144)

## 2017-02-09 NOTE — TELEPHONE ENCOUNTER
The patient called to request three more referrals to specialists; this is all in regards to her hospital stay; the patient needs the following referrals:      Endocrinologist:  Dr. Flora Loco    Pulmonary:  Dr. Shaila Quintanilla    Liver:  Dr. Dilip Tejeda in [place]    Once the referrals are put in the system I can do the insurance referral for the patient; thank you

## 2017-02-10 NOTE — TELEPHONE ENCOUNTER
Pt called on the number on file. Lab results were given and 's plan of care, pt verbalize understanding of this information.

## 2017-02-13 ENCOUNTER — OFFICE VISIT (OUTPATIENT)
Dept: ENDOCRINOLOGY | Age: 47
End: 2017-02-13

## 2017-02-13 VITALS
DIASTOLIC BLOOD PRESSURE: 75 MMHG | BODY MASS INDEX: 22.01 KG/M2 | RESPIRATION RATE: 18 BRPM | HEIGHT: 63 IN | SYSTOLIC BLOOD PRESSURE: 138 MMHG | HEART RATE: 93 BPM | TEMPERATURE: 97.1 F | WEIGHT: 124.2 LBS

## 2017-02-13 DIAGNOSIS — I10 ESSENTIAL HYPERTENSION: ICD-10-CM

## 2017-02-13 DIAGNOSIS — E11.65 TYPE 2 DIABETES MELLITUS WITH HYPERGLYCEMIA, WITH LONG-TERM CURRENT USE OF INSULIN (HCC): Primary | ICD-10-CM

## 2017-02-13 DIAGNOSIS — Z79.4 TYPE 2 DIABETES MELLITUS WITH HYPERGLYCEMIA, WITH LONG-TERM CURRENT USE OF INSULIN (HCC): Primary | ICD-10-CM

## 2017-02-13 LAB
GLUCOSE POC: NORMAL MG/DL
HBA1C MFR BLD HPLC: 10.8 %

## 2017-02-13 NOTE — PROGRESS NOTES
Fidel Bowden is a 52 y.o. female here for   Chief Complaint   Patient presents with    Diabetes       Functional glucose monitor and record keeping system? - yes  Eye exam within last year? - yes end of 2016  Foot exam within last year? - yes     Lab Results   Component Value Date/Time    Hemoglobin A1c 12.8 04/01/2010 05:00 AM    Hemoglobin A1c (POC) 10.7 12/07/2015 10:15 AM       Wt Readings from Last 3 Encounters:   02/08/17 134 lb (60.8 kg)   02/01/17 134 lb (60.8 kg)   02/02/16 137 lb 14.4 oz (62.6 kg)     Temp Readings from Last 3 Encounters:   02/08/17 98 °F (36.7 °C) (Oral)   02/01/17 98.2 °F (36.8 °C) (Oral)   02/02/16 99.2 °F (37.3 °C) (Oral)     BP Readings from Last 3 Encounters:   02/08/17 147/89   02/01/17 (!) 138/94   02/02/16 (!) 176/93     Pulse Readings from Last 3 Encounters:   02/08/17 95   02/01/17 87   02/02/16 95

## 2017-02-13 NOTE — MR AVS SNAPSHOT
Visit Information Date & Time Provider Department Dept. Phone Encounter #  
 2/13/2017  3:30 PM Gabriela Peralta MD Care [facility] Endocrinology [phone] [id number] Follow-up Instructions Return in about 6 weeks (around 3/27/2017). Your Appointments 5/8/2017  2:45 PM  
ROUTINE CARE with MD Patsy Cyr. Jamila Seaman 90 [address] [facility]) Appt Note: 3 month f/u visit for medication check; anxiety medication [address] [phone]  
[place] [address] Upcoming Health Maintenance Date Due  
 LIPID PANEL Q1 1970 FOOT EXAM Q1 1/2/1980 MICROALBUMIN Q1 1/2/1980 EYE EXAM RETINAL OR DILATED Q1 1/2/1980 Pneumococcal 19-64 Medium Risk (1 of 1 - PPSV23) 1/2/1989 DTaP/Tdap/Td series (1 - Tdap) 1/2/1991 PAP AKA CERVICAL CYTOLOGY 1/2/1991 HEMOGLOBIN A1C Q6M 6/7/2016 INFLUENZA AGE 9 TO ADULT 8/1/2016 Allergies as of 2/13/2017  Review Complete On: 2/13/2017 By: Gabriela Peralta MD  
  
 Severity Noted Reaction Type Reactions Other Food  12/07/2015    Swelling Saumya President Sulfa (Sulfonamide Antibiotics)  05/27/2011    Hives Current Immunizations  Never Reviewed No immunizations on file. Not reviewed this visit You Were Diagnosed With   
  
 Codes Comments Type 2 diabetes mellitus with hyperglycemia, with long-term current use of insulin (HCC)    -  Primary ICD-10-CM: E11.65, Z79.4 ICD-9-CM: 250.00, 790.29, V58.67 Essential hypertension     ICD-10-CM: I10 
ICD-9-CM: 401.9 Vitals BP Pulse Temp Resp Height(growth percentile) Weight(growth percentile) 138/75 (BP 1 Location: Left arm, BP Patient Position: Sitting) 93 97.1 °F (36.2 °C) (Oral) 18 5' 3\" (1.6 m) 124 lb 3.2 oz (56.3 kg) BMI OB Status Smoking Status 22 kg/m2 Having regular periods Former Smoker BMI and BSA Data Body Mass Index Body Surface Area  
 22 kg/m 2 1.58 m 2 Preferred Pharmacy Pharmacy Name Phone [facility]/PHARMACY #[id number]- [facility] [phone] Your Updated Medication List  
  
   
This list is accurate as of: 2/13/17  5:36 PM.  Always use your most recent med list.  
  
  
  
  
 Blood-Glucose Meter monitoring kit Commonly known as:  FREESTYLE LITE METER Use to test blood glucose 4 times daily  
  
 bumetanide 1 mg tablet Commonly known as:  Justyna Mart Take 1 Tab by mouth daily. busPIRone 5 mg tablet Commonly known as:  BUSPAR Take 1 Tab by mouth nightly. CLARITIN 10 mg tablet Generic drug:  loratadine Take 10 mg by mouth daily as needed. cyanocobalamin 1,000 mcg tablet Commonly known as:  VITAMIN B-12 Take 1 Tab by mouth daily. ferrous sulfate 325 mg (65 mg iron) tablet Take  by mouth Daily (before breakfast). folic acid 1 mg tablet Commonly known as:  Google Take 1 Tab by mouth daily. glucose blood VI test strips strip Commonly known as:  FREESTYLE LITE STRIPS Use to test blood glucose 4 times daily  
  
 insulin detemir 100 unit/mL (3 mL) Inpn Commonly known as:  Floy Dace Inject 16 units at bed time  
  
 insulin lispro 100 unit/mL kwikpen Commonly known as:  HUMALOG Inject 4- 5  units before breakfast, 4-5 units before lunch and 4-5 units before dinner w/ SSI Max units daily: 30 Insulin Needles (Disposable) 32 gauge x 5/32\" Ndle Commonly known as:  Randee Pen Needle Use with humalog and Levemir Insulin Syringe-Needle U-100 0.3 mL 30 gauge x 1/2\" Syrg Use as directed Lancets Misc Use to test blood glucose 4 times daily  
  
 metoprolol tartrate 25 mg tablet Commonly known as:  LOPRESSOR Take 0.5 Tabs by mouth two (2) times a day. MULTIVITAMIN PO Take 1 Tab by mouth daily. We Performed the Following AMB POC GLUCOSE, QUANTITATIVE, BLOOD [73642 CPT(R)] AMB POC HEMOGLOBIN A1C [97530 CPT(R)] Follow-up Instructions Return in about 6 weeks (around 3/27/2017). Patient Instructions Check blood sugars before meals  and at bedtime. Low blood glucose is less than 70 Maintain the log and bring it all your appointments If the bedtime sugars are less than 100 ,eat a 15 gm snack. Lantus or Levemir insulin 20 units at dinner You need Humalog 1 unit for 15 grams of carbs  -  For eg if you eat 60 grams for a meal , divide it by 12 which is 5 units of Humalog is what you need for that meal  
 
Additional  Humalog or Apidra with meals for high blood sugars 120 - 170 mg               1 unit 171 -220 mg   2 units 221- 270 mg   3 units 271-320 mg   4 units 321-370 mg   5 units 371-420 mg              6 units 421- 470 mg              7 units 471- 520 mg               8 units 521 - 570 mg              9 units 571 - 620 mg   10 units · Introducing \[facility]\"" & HEALTH SERVICES! Jovi Esquivel introduces [employer] patient portal. Now you can access parts of your medical record, email your doctor's office, and request medication refills online. 1. In your internet browser, go to [email or web address] 2. Click on the First Time User? Click Here link in the Sign In box. You will see the New Member Sign Up page. 3. Enter your [employer] Access Code exactly as it appears below. You will not need to use this code after youve completed the sign-up process. If you do not sign up before the expiration date, you must request a new code. · [employer] Access Code: [email or web address] Expires: 5/2/2017  2:34 PM 
 
4. Enter the last four digits of your Social Security Number (xxxx) and Date of Birth (mm/dd/yyyy) as indicated and click Submit. You will be taken to the next sign-up page. 5. Create a DriveK ID.  This will be your [employer]K login ID and cannot be changed, so think of one that is secure and easy to remember. 6. Create a Instantis password. You can change your password at any time. 7. Enter your Password Reset Question and Answer. This can be used at a later time if you forget your password. 8. Enter your e-mail address. You will receive e-mail notification when new information is available in [address] 9. Click Sign Up. You can now view and download portions of your medical record. 10. Click the Download Summary menu link to download a portable copy of your medical information. If you have questions, please visit the Frequently Asked Questions section of the [employer] website. Remember, [employer] is NOT to be used for urgent needs. For medical emergencies, dial 911. Now available from your iPhone and Android! Please provide this summary of care documentation to your next provider. Your primary care clinician is listed as Luna Carpio. If you have any questions after today's visit, please call [phone].

## 2017-02-13 NOTE — PATIENT INSTRUCTIONS
Check blood sugars before meals  and at bedtime. Low blood glucose is less than 70     Maintain the log and bring it all your appointments    If the bedtime sugars are less than 100 ,eat a 15 gm snack.     Lantus or Levemir insulin 20 units at dinner     You need Humalog 1 unit for 15 grams of carbs  -  For eg if you eat 60 grams for a meal , divide it by 12 which is 5 units of Humalog is what you need for that meal     Additional  Humalog or Apidra with meals for high blood sugars     120 - 170 mg               1 unit   171 -220 mg   2 units   221- 270 mg   3 units   271-320 mg   4 units   321-370 mg   5 units   371-420 mg              6 units  421- 470 mg              7 units   471- 520 mg               8 units  521 - 570 mg              9 units  571 - 620 mg   10 units       ·

## 2017-02-14 DIAGNOSIS — Z79.4 UNCONTROLLED TYPE 2 DIABETES MELLITUS WITH HYPERGLYCEMIA, WITH LONG-TERM CURRENT USE OF INSULIN (HCC): ICD-10-CM

## 2017-02-14 DIAGNOSIS — Z79.4 TYPE 2 DIABETES MELLITUS WITH HYPERGLYCEMIA, WITH LONG-TERM CURRENT USE OF INSULIN (HCC): Primary | ICD-10-CM

## 2017-02-14 DIAGNOSIS — E11.65 TYPE 2 DIABETES MELLITUS WITH HYPERGLYCEMIA, WITH LONG-TERM CURRENT USE OF INSULIN (HCC): Primary | ICD-10-CM

## 2017-02-14 DIAGNOSIS — E11.65 UNCONTROLLED TYPE 2 DIABETES MELLITUS WITH HYPERGLYCEMIA, WITH LONG-TERM CURRENT USE OF INSULIN (HCC): ICD-10-CM

## 2017-02-14 RX ORDER — PEN NEEDLE, DIABETIC 31 GX3/16"
NEEDLE, DISPOSABLE MISCELLANEOUS
Qty: 100 PEN NEEDLE | Refills: 11 | Status: SHIPPED | OUTPATIENT
Start: 2017-02-14 | End: 2017-05-16 | Stop reason: SDUPTHER

## 2017-02-14 RX ORDER — INSULIN GLARGINE 100 [IU]/ML
INJECTION, SOLUTION SUBCUTANEOUS
Qty: 1 VIAL | Refills: 0 | Status: SHIPPED | COMMUNITY
Start: 2017-02-14 | End: 2017-02-14 | Stop reason: ALTCHOICE

## 2017-02-14 RX ORDER — INSULIN GLARGINE 100 [IU]/ML
INJECTION, SOLUTION SUBCUTANEOUS
Qty: 10 ML | Refills: 5 | Status: SHIPPED | OUTPATIENT
Start: 2017-02-14 | End: 2017-06-07 | Stop reason: ALTCHOICE

## 2017-02-14 RX ORDER — INSULIN LISPRO 100 [IU]/ML
INJECTION, SOLUTION INTRAVENOUS; SUBCUTANEOUS
Qty: 15 ML | Refills: 5 | Status: SHIPPED | OUTPATIENT
Start: 2017-02-14 | End: 2017-02-23 | Stop reason: SDUPTHER

## 2017-02-21 DIAGNOSIS — Z79.4 UNCONTROLLED TYPE 2 DIABETES MELLITUS WITH HYPERGLYCEMIA, WITH LONG-TERM CURRENT USE OF INSULIN (HCC): ICD-10-CM

## 2017-02-21 DIAGNOSIS — E11.65 UNCONTROLLED TYPE 2 DIABETES MELLITUS WITH HYPERGLYCEMIA, WITH LONG-TERM CURRENT USE OF INSULIN (HCC): ICD-10-CM

## 2017-02-21 NOTE — TELEPHONE ENCOUNTER
Patient would like to speak with nurse about changing RX for testing strips. RX states 4 times per day testing. shis checking 6-10 times per day. She would like updated RX sent to [facility].  She also needs refill on Humalog pens.

## 2017-02-23 RX ORDER — INSULIN LISPRO 100 [IU]/ML
INJECTION, SOLUTION INTRAVENOUS; SUBCUTANEOUS
Qty: 15 ML | Refills: 5 | Status: SHIPPED | OUTPATIENT
Start: 2017-02-23 | End: 2017-05-18 | Stop reason: SDUPTHER

## 2017-02-27 ENCOUNTER — TELEPHONE (OUTPATIENT)
Dept: FAMILY MEDICINE CLINIC | Age: 47
End: 2017-02-27

## 2017-02-27 DIAGNOSIS — D64.9 ANEMIA, UNSPECIFIED TYPE: Primary | ICD-10-CM

## 2017-02-27 NOTE — TELEPHONE ENCOUNTER
The patient needs a referral to Dr. Teddy Trevizo; Dr. Anish Marie is a hematology/oncology doctor; once the referral is in place I can do the insurance referral; no reason was given for the purpose of the visit with Dr. Anish Marie; thank you

## 2017-05-16 ENCOUNTER — OFFICE VISIT (OUTPATIENT)
Dept: ENDOCRINOLOGY | Age: 47
End: 2017-05-16

## 2017-05-16 VITALS
SYSTOLIC BLOOD PRESSURE: 191 MMHG | HEART RATE: 90 BPM | RESPIRATION RATE: 16 BRPM | HEIGHT: 63 IN | TEMPERATURE: 98.1 F | DIASTOLIC BLOOD PRESSURE: 96 MMHG | BODY MASS INDEX: 22.25 KG/M2 | WEIGHT: 125.6 LBS

## 2017-05-16 DIAGNOSIS — E11.65 UNCONTROLLED TYPE 2 DIABETES MELLITUS WITH HYPERGLYCEMIA, WITH LONG-TERM CURRENT USE OF INSULIN (HCC): ICD-10-CM

## 2017-05-16 DIAGNOSIS — E11.65 TYPE 2 DIABETES MELLITUS WITH HYPERGLYCEMIA, WITH LONG-TERM CURRENT USE OF INSULIN (HCC): Primary | ICD-10-CM

## 2017-05-16 DIAGNOSIS — I10 ESSENTIAL HYPERTENSION: ICD-10-CM

## 2017-05-16 DIAGNOSIS — E11.49 DIABETIC NEUROPATHY WITH NEUROLOGIC COMPLICATION (HCC): ICD-10-CM

## 2017-05-16 DIAGNOSIS — Z79.4 UNCONTROLLED TYPE 2 DIABETES MELLITUS WITH HYPERGLYCEMIA, WITH LONG-TERM CURRENT USE OF INSULIN (HCC): ICD-10-CM

## 2017-05-16 DIAGNOSIS — E11.40 DIABETIC NEUROPATHY WITH NEUROLOGIC COMPLICATION (HCC): ICD-10-CM

## 2017-05-16 DIAGNOSIS — Z79.4 TYPE 2 DIABETES MELLITUS WITH HYPERGLYCEMIA, WITH LONG-TERM CURRENT USE OF INSULIN (HCC): Primary | ICD-10-CM

## 2017-05-16 LAB — HBA1C MFR BLD HPLC: 11.8 %

## 2017-05-16 RX ORDER — ACETAMINOPHEN 160 MG/5ML
SUSPENSION, ORAL (FINAL DOSE FORM) ORAL
Qty: 100 SYRINGE | Refills: 3 | Status: SHIPPED | OUTPATIENT
Start: 2017-05-16

## 2017-05-16 RX ORDER — LANCING DEVICE
EACH MISCELLANEOUS
Qty: 1 EACH | Refills: 0 | Status: SHIPPED | OUTPATIENT
Start: 2017-05-16

## 2017-05-16 RX ORDER — ISOPROPYL ALCOHOL 70 ML/100ML
SWAB TOPICAL
Qty: 120 PAD | Refills: 11 | Status: SHIPPED | OUTPATIENT
Start: 2017-05-16

## 2017-05-16 RX ORDER — PEN NEEDLE, DIABETIC 31 GX3/16"
NEEDLE, DISPOSABLE MISCELLANEOUS
Qty: 100 PEN NEEDLE | Refills: 11 | Status: SHIPPED | OUTPATIENT
Start: 2017-05-16

## 2017-05-16 RX ORDER — LANCETS
EACH MISCELLANEOUS
Qty: 400 EACH | Refills: 11 | Status: SHIPPED | OUTPATIENT
Start: 2017-05-16

## 2017-05-16 NOTE — MR AVS SNAPSHOT
Visit Information Date & Time Provider Department Dept. Phone Encounter #  
 5/16/2017  3:30 PM Guanaco Vila MD [facility] [phone] [id number] Follow-up Instructions Return in about 2 months (around 7/16/2017). Your Appointments 7/18/2017  3:15 PM  
ROUTINE CARE with Guanaco Vila MD  
Care Diabetes & Endocrinology [address] [facility]) Appt Note: 2 mo fu  
 [address] [facility] [address] [phone]  
  
   
 [address] [place] [address] Upcoming Health Maintenance Date Due  
 LIPID PANEL Q1 1970 FOOT EXAM Q1 1/2/1980 MICROALBUMIN Q1 1/2/1980 EYE EXAM RETINAL OR DILATED Q1 1/2/1980 Pneumococcal 19-64 Medium Risk (1 of 1 - PPSV23) 1/2/1989 DTaP/Tdap/Td series (1 - Tdap) 1/2/1991 PAP AKA CERVICAL CYTOLOGY 1/2/1991 INFLUENZA AGE 9 TO ADULT 8/1/2017 HEMOGLOBIN A1C Q6M 8/13/2017 Allergies as of 5/16/2017  Review Complete On: 5/16/2017 By: Guanaco Vila MD  
  
 Severity Noted Reaction Type Reactions Other Food  12/07/2015    Swelling Sree Beltran Sulfa (Sulfonamide Antibiotics)  05/27/2011    Hives Current Immunizations  Never Reviewed No immunizations on file. Not reviewed this visit You Were Diagnosed With   
  
 Codes Comments Type 2 diabetes mellitus with hyperglycemia, with long-term current use of insulin (HCC)    -  Primary ICD-10-CM: E11.65, Z79.4 ICD-9-CM: 250.00, 790.29, V58.67 Essential hypertension     ICD-10-CM: I10 
ICD-9-CM: 401.9 Vitals BP Pulse Temp Resp Height(growth percentile) Weight(growth percentile) (!) 191/96 (BP 1 Location: Left arm, BP Patient Position: Sitting) 90 98.1 °F (36.7 °C) (Oral) 16 5' 3\" (1.6 m) 125 lb 9.6 oz (57 kg) BMI OB Status Smoking Status 22.25 kg/m2 Having regular periods Former Smoker BMI and BSA Data Body Mass Index Body Surface Area 22.25 kg/m 2 1.59 m 2 Preferred Pharmacy Pharmacy Name Phone [facility]/PHARMACY #[id number]- [place], [facility] [phone] Your Updated Medication List  
  
   
This list is accurate as of: 5/16/17  5:21 PM.  Always use your most recent med list.  
  
  
  
  
 alcohol swabs Padm Use to check blood sugars 4 times daily. E11.65 Blood-Glucose Meter monitoring kit Commonly known as:  FREESTYLE LITE METER Use to test blood glucose 4 times daily  
  
 bumetanide 1 mg tablet Commonly known as:  Jessi Alba Take 1 Tab by mouth daily. busPIRone 5 mg tablet Commonly known as:  BUSPAR Take 1 Tab by mouth nightly. CLARITIN 10 mg tablet Generic drug:  loratadine Take 10 mg by mouth daily as needed. cyanocobalamin 1,000 mcg tablet Commonly known as:  VITAMIN B-12 Take 1 Tab by mouth daily. ferrous sulfate 325 mg (65 mg iron) tablet Take  by mouth Daily (before breakfast). folic acid 1 mg tablet Commonly known as:  Google Take 1 Tab by mouth daily. glucose blood VI test strips strip Commonly known as:  FREESTYLE LITE STRIPS Use to test blood glucose 6-10 times daily Dx Code: E11.65  
  
 insulin glargine 100 unit/mL injection Commonly known as:  LANTUS Inject 20 units at dinner * insulin lispro 100 unit/mL kwikpen Commonly known as:  HUMALOG Inject 1 unit for 15 grams of carbs w/ SSI Max units daily: 30  
  
 * insulin lispro 200 unit/mL (3 mL) Inpn Commonly known as:  HumaLOG KwikPen Dispense as sample per Dr. Aarti Magallanes Insulin Needles (Disposable) 32 gauge x 5/32\" Ndle Commonly known as:  Randee Pen Needle Use with Humalog 3 times daily Dx Code: E11.65 Insulin Syringe-Needle U-100 0.3 mL 30 gauge x 1/2\" Syrg Use as directed to inject Lantus daily. Dx code E11.65 Lancets Misc Use to test blood glucose 4 times daily Lancing Device Misc Use to check blood sugars 4 times daily. E11.65  
  
 metoprolol tartrate 25 mg tablet Commonly known as:  LOPRESSOR Take 0.5 Tabs by mouth two (2) times a day. MULTIVITAMIN PO Take 1 Tab by mouth daily. * Notice: This list has 2 medication(s) that are the same as other medications prescribed for you. Read the directions carefully, and ask your doctor or other care provider to review them with you. We Performed the Following AMB POC HEMOGLOBIN A1C [29238 CPT(R)] Follow-up Instructions Return in about 2 months (around 7/16/2017). Patient Instructions Check blood sugars before meals  and at bedtime. Low blood glucose is less than 70 Maintain the log and bring it all your appointments If the bedtime sugars are less than 100 ,eat a 15 gm snack. Lantus or Levemir insulin 20 units at dinner You need Humalog 1 unit for 12 grams of carbs  -  For eg if you eat 60 grams for a meal , divide it by 12 which is 5 units of Humalog is what you need for that meal  
 
Additional  Humalog or Apidra with meals for high blood sugars 120 - 170 mg               1 unit 171 -220 mg   2 units 221- 270 mg   3 units 271-320 mg   4 units 321-370 mg   5 units 371-420 mg              6 units 421- 470 mg              7 units 471- 520 mg               8 units 521 - 570 mg              9 units 571 - 620 mg   10 units Introducing [facility] & HEALTH SERVICES! [facility] introduces [employer] patient portal. Now you can access parts of your medical record, email your doctor's office, and request medication refills online. 1. In your internet browser, go to [email or web address]. [email or web address] 2. Click on the First Time User? Click Here link in the Sign In box. You will see the New Member Sign Up page. 3. Enter your [employer] Access Code exactly as it appears below. You will not need to use this code after youve completed the sign-up process.  If you do not sign up before the expiration date, you must request a new code. · Packetmotion Access Code: [email or web address] Expires: 8/14/2017  5:20 PM 
 
4. Enter the last four digits of your Social Security Number (xxxx) and Date of Birth (mm/dd/yyyy) as indicated and click Submit. You will be taken to the next sign-up page. 5. Create a Packetmotion ID. This will be your Packetmotion login ID and cannot be changed, so think of one that is secure and easy to remember. 6. Create a Packetmotion password. You can change your password at any time. 7. Enter your Password Reset Question and Answer. This can be used at a later time if you forget your password. 8. Enter your e-mail address. You will receive e-mail notification when new information is available in [address] 9. Click Sign Up. You can now view and download portions of your medical record. 10. Click the Download Summary menu link to download a portable copy of your medical information. If you have questions, please visit the Frequently Asked Questions section of the [employer] website. Remember, [employer] is NOT to be used for urgent needs. For medical emergencies, dial 911. Now available from your iPhone and Android! Please provide this summary of care documentation to your next provider. Your primary care clinician is listed as Kvng Bhardwaj. If you have any questions after today's visit, please call [phone].

## 2017-05-16 NOTE — PATIENT INSTRUCTIONS
Check blood sugars before meals  and at bedtime. Low blood glucose is less than 70     Maintain the log and bring it all your appointments    If the bedtime sugars are less than 100 ,eat a 15 gm snack.     Lantus or Levemir insulin 20 units at dinner     You need Humalog 1 unit for 12 grams of carbs  -  For eg if you eat 60 grams for a meal , divide it by 12 which is 5 units of Humalog is what you need for that meal or   Small carb meal 4 units of insulin , moderate carb meal 5 units and bigger carb meals 6 - 7  units     Additional  Humalog or Apidra with meals for high blood sugars     120 - 170 mg               1 unit   171 -220 mg   2 units   221- 270 mg   3 units   271-320 mg   4 units   321-370 mg   5 units   371-420 mg              6 units  421- 470 mg              7 units   471- 520 mg               8 units  521 - 570 mg              9 units  571 - 620 mg   10 units

## 2017-05-16 NOTE — PROGRESS NOTES
Franklin Stewart is a 52 y.o. female here for   Chief Complaint   Patient presents with    Diabetes     Functional glucose monitor and record keeping system? - yes  Eye exam within last year? - yes end of 2016  Foot exam within last year? - yes    Lab Results   Component Value Date/Time    Hemoglobin A1c 12.8 04/01/2010 05:00 AM    Hemoglobin A1c (POC) 10.8 02/13/2017 04:25 PM       Wt Readings from Last 3 Encounters:   02/13/17 124 lb 3.2 oz (56.3 kg)   02/08/17 134 lb (60.8 kg)   02/01/17 134 lb (60.8 kg)     Temp Readings from Last 3 Encounters:   02/13/17 97.1 °F (36.2 °C) (Oral)   02/08/17 98 °F (36.7 °C) (Oral)   02/01/17 98.2 °F (36.8 °C) (Oral)     BP Readings from Last 3 Encounters:   02/13/17 138/75   02/08/17 147/89   02/01/17 (!) 138/94     Pulse Readings from Last 3 Encounters:   02/13/17 93   02/08/17 95   02/01/17 87

## 2017-05-16 NOTE — PROGRESS NOTES
[facility]               Ekaterina Kent MD        [address] 78 [phone] Fax [phone]           Patient Information  Date:5/18/2017  Name : Emelia Antunez 52 y.o.     YOB: 1970         Referred by: Tari Muñoz MD         Chief Complaint   Patient presents with    Diabetes       History of Present Illness: Emelia Antunez is a 52 y.o. female here for fu  of  Type 1 Diabetes Mellitus. She has poor follow up, compliance is questionable. She has been intermittently taking insulin during the last one year. She was seen after a long gap in February, 2016, I had arranged for nutritional classes and also pump education, which she did not attend, dieticians have made many attempts to contact the patient. She is checking blood glucose six times and they are running 300s to 500s. Reports that she is afraid to get more insulin and is taking less. Basal insulin was increased and written instructions were given. She is on 15 units instead of  20. Has weakness, tiredness, polyuria. Wt Readings from Last 3 Encounters:   05/16/17 125 lb 9.6 oz (57 kg)   02/13/17 124 lb 3.2 oz (56.3 kg)   02/08/17 134 lb (60.8 kg)       BP Readings from Last 3 Encounters:   05/16/17 (!) 191/96   02/13/17 138/75   02/08/17 147/89           Past Medical History:   Diagnosis Date    Anxiety disorder     Bell's palsy     Diabetes mellitus     Headache(784.0)     Neuropathic pain     Ventricular septal defect, single     heart murmur     Current Outpatient Prescriptions   Medication Sig    insulin lispro (HUMALOG) 100 unit/mL kwikpen Inject 1 unit for 12 grams of carbs w/ SSI Max units daily: 30    lisinopril (PRINIVIL, ZESTRIL) 20 mg tablet Take 1 Tab by mouth daily.     insulin lispro (HUMALOG KWIKPEN) 200 unit/mL (3 mL) inpn Dispense as sample per Dr. Ivy Cowden    glucose blood VI test strips (FREESTYLE LITE STRIPS) strip Use to test blood glucose 6-10 times daily Dx Code: E11.65    insulin glargine (LANTUS) 100 unit/mL injection Inject 20 units at dinner (Patient taking differently: Inject 15 units at dinner)    Blood-Glucose Meter (FREESTYLE LITE METER) monitoring kit Use to test blood glucose 4 times daily    ferrous sulfate 325 mg (65 mg iron) tablet Take  by mouth Daily (before breakfast).  cyanocobalamin (VITAMIN B-12) 1,000 mcg tablet Take 1 Tab by mouth daily.  bumetanide (BUMEX) 1 mg tablet Take 1 Tab by mouth daily.  folic acid (FOLVITE) 1 mg tablet Take 1 Tab by mouth daily.  loratadine (CLARITIN) 10 mg tablet Take 10 mg by mouth daily as needed.  MULTIVITAMIN PO Take 1 Tab by mouth daily.  Lancets misc Use to test blood glucose 4 times daily    Insulin Syringe-Needle U-100 0.3 mL 30 gauge x 1/2\" syrg Use as directed to inject Lantus daily. Dx code E11.65    alcohol swabs padm Use to check blood sugars 4 times daily. E11.65    Lancing Device misc Use to check blood sugars 4 times daily. E11.65    Insulin Needles, Disposable, (EV PEN NEEDLE) 32 gauge x 5/32\" ndle Use with Humalog 3 times daily Dx Code: E11.65    metoprolol tartrate (LOPRESSOR) 25 mg tablet Take 0.5 Tabs by mouth two (2) times a day.  busPIRone (BUSPAR) 5 mg tablet Take 1 Tab by mouth nightly. No current facility-administered medications for this visit. Allergies   Allergen Reactions    Other Food Swelling     Cinnamon    Sulfa (Sulfonamide Antibiotics) Hives         Review of Systems:  - Constitutional Symptoms: no fevers, no chills, no weight loss  - Eyes: no blurry vision no double vision  - Gastrointestinal: no dysphagia no  abdominal pain  - Musculoskeletal: no joint pains + weakness  - Integumentary: no rashes  - Neurological: +  numbness, tingling, no  headaches  -     Physical Examination:   Blood pressure (!) 191/96, pulse 90, temperature 98.1 °F (36.7 °C), temperature source Oral, resp.  rate 16, height 5' 3\" (1.6 m), weight 125 lb 9.6 oz (57 kg). Estimated body mass index is 22.25 kg/(m^2) as calculated from the following:    Height as of this encounter: 5' 3\" (1.6 m). -   Weight as of this encounter: 125 lb 9.6 oz (57 kg). - General: pleasant, no distress, good eye contact  - HEENT: no pallor, no periorbital edema, EOMI  - Neck: supple,   - Cardiovascular: regular, normal rate, normal S1 and S2, murmur  - Respiratory: clear to auscultation bilaterally  - Gastrointestinal: soft, nontender, nondistended,  BS +  - Musculoskeletal: no acanthosis nigricans,no edema,   - Neurological:alert and oriented  - Psychiatric: normal mood and affect  - Skin: color, texture, turgor normal.       Data Reviewed:     [] Glucose records reviewed. [] See glucose records for details (to be scanned). [] A1C  [] Reviewed labs      Assessment/Plan:     1. Type 2 diabetes mellitus with hyperglycemia, with long-term current use of insulin ([address]    2. Essential hypertension    3. Uncontrolled type 2 diabetes mellitus with hyperglycemia, with long-term current use of insulin ([address]        1. Type 1 Diabetes Mellitus   Lab Results   Component Value Date/Time    Hemoglobin A1c 12.8 04/01/2010 05:00 AM    Hemoglobin A1c (POC) 11.8 05/16/2017 03:51 PM    She has poorly controlled diabetes on basal bolus regimen. C peptide < 0.1 SUSHANT positive glu was 250   Lantus 20  units,Carb ratio is 1 for every 12 grams, nutrition class/carb counting classes   Correction: Humalog 1 unit for every 50 mg of glucose over 120. Apps discussed for carb counting    She is overwhelmed with the disease, discussed again at length with the patient to get  nutritionist to help with the carb counting. She was contacted by the dietitian several times, but was unsuccessful. Small carb meals, 4 units, moderate carb meals, 5 units, and high carb, 6-7 units. She was taking 15 units at night and was advised to take 20 units. She is at a very high risk for DKA, hyperglycemia.   There were several blood glucoses which were in the 500s. She sometimes not taking insulin as she is afraid of hypoglycemia. She is a good pump candidate, but has to have education and motivation. Severe hypertension. No chest pain or shortness of breath. Lisinopril was added. Discussed importance of follow up to titrate insulin - if she accounts for all carbs and takes correction and continues to have hyperglycemia we can then titrate      2. HTN : Lisinopril   Had ARF likely due to dehydration , now renal parameters have improved  Risk for CVA    3 Peripheral neuropathy :She has neuropathy symptoms which is related to poor glycemic control. Discussed same with the patient and good glycemic control is the goal.  Also, discussed complications. Patient Instructions   Check blood sugars before meals  and at bedtime. Low blood glucose is less than 70     Maintain the log and bring it all your appointments    If the bedtime sugars are less than 100 ,eat a 15 gm snack. Lantus or Levemir insulin 20 units at dinner     You need Humalog 1 unit for 12 grams of carbs  -  For eg if you eat 60 grams for a meal , divide it by 12 which is 5 units of Humalog is what you need for that meal or   Small carb meal 4 units of insulin , moderate carb meal 5 units and bigger carb meals 6 - 7  units     Additional  Humalog or Apidra with meals for high blood sugars     120 - 170 mg               1 unit   171 -220 mg   2 units   221- 270 mg   3 units   271-320 mg   4 units   321-370 mg   5 units   371-420 mg              6 units  421- 470 mg              7 units   471- 520 mg               8 units  521 - 570 mg              9 units  571 - 620 mg   10 units           Follow-up Disposition:  Return in about 2 months (around 7/16/2017). Thank you for allowing me to participate in the care of this patient.     Karis Arriola MD      At high risk for complications

## 2017-05-18 PROBLEM — E11.40 DIABETIC NEUROPATHY WITH NEUROLOGIC COMPLICATION (HCC): Status: ACTIVE | Noted: 2017-05-18

## 2017-05-18 PROBLEM — E11.49 DIABETIC NEUROPATHY WITH NEUROLOGIC COMPLICATION (HCC): Status: ACTIVE | Noted: 2017-05-18

## 2017-05-18 RX ORDER — INSULIN LISPRO 100 [IU]/ML
INJECTION, SOLUTION INTRAVENOUS; SUBCUTANEOUS
Qty: 15 ML | Refills: 5 | Status: SHIPPED | OUTPATIENT
Start: 2017-05-18

## 2017-05-18 RX ORDER — LISINOPRIL 20 MG/1
20 TABLET ORAL DAILY
Qty: 30 TAB | Refills: 4 | Status: SHIPPED | OUTPATIENT
Start: 2017-05-18

## 2017-06-07 DIAGNOSIS — Z79.4 TYPE 2 DIABETES MELLITUS WITH HYPERGLYCEMIA, WITH LONG-TERM CURRENT USE OF INSULIN (HCC): Primary | ICD-10-CM

## 2017-06-07 DIAGNOSIS — E11.65 TYPE 2 DIABETES MELLITUS WITH HYPERGLYCEMIA, WITH LONG-TERM CURRENT USE OF INSULIN (HCC): Primary | ICD-10-CM

## 2017-06-11 DIAGNOSIS — R60.9 PERIPHERAL EDEMA: ICD-10-CM

## 2017-06-12 RX ORDER — BUMETANIDE 1 MG/1
TABLET ORAL
Qty: 30 TAB | Refills: 3 | Status: SHIPPED | OUTPATIENT
Start: 2017-06-12

## 2017-07-18 ENCOUNTER — OFFICE VISIT (OUTPATIENT)
Dept: ENDOCRINOLOGY | Age: 47
End: 2017-07-18

## 2017-07-18 VITALS
OXYGEN SATURATION: 100 % | WEIGHT: 118 LBS | HEART RATE: 95 BPM | RESPIRATION RATE: 16 BRPM | SYSTOLIC BLOOD PRESSURE: 193 MMHG | DIASTOLIC BLOOD PRESSURE: 104 MMHG | HEIGHT: 63 IN | TEMPERATURE: 99 F | BODY MASS INDEX: 20.91 KG/M2

## 2017-07-18 DIAGNOSIS — E11.65 TYPE 2 DIABETES MELLITUS WITH HYPERGLYCEMIA, WITH LONG-TERM CURRENT USE OF INSULIN (HCC): Primary | ICD-10-CM

## 2017-07-18 DIAGNOSIS — E11.65 TYPE 2 DIABETES MELLITUS WITH HYPERGLYCEMIA, WITH LONG-TERM CURRENT USE OF INSULIN (HCC): ICD-10-CM

## 2017-07-18 DIAGNOSIS — G62.9 PERIPHERAL POLYNEUROPATHY: ICD-10-CM

## 2017-07-18 DIAGNOSIS — Z79.4 TYPE 2 DIABETES MELLITUS WITH HYPERGLYCEMIA, WITH LONG-TERM CURRENT USE OF INSULIN (HCC): Primary | ICD-10-CM

## 2017-07-18 DIAGNOSIS — D64.9 ANEMIA, UNSPECIFIED TYPE: ICD-10-CM

## 2017-07-18 DIAGNOSIS — I10 ESSENTIAL HYPERTENSION: ICD-10-CM

## 2017-07-18 DIAGNOSIS — Z79.4 TYPE 2 DIABETES MELLITUS WITH HYPERGLYCEMIA, WITH LONG-TERM CURRENT USE OF INSULIN (HCC): ICD-10-CM

## 2017-07-18 RX ORDER — INSULIN GLARGINE 100 [IU]/ML
20 INJECTION, SOLUTION SUBCUTANEOUS
Qty: 10 ML | Refills: 11
Start: 2017-07-18

## 2017-07-18 RX ORDER — INSULIN GLARGINE 100 [IU]/ML
INJECTION, SOLUTION SUBCUTANEOUS
Qty: 1 VIAL | Refills: 0 | Status: SHIPPED | COMMUNITY
Start: 2017-07-18

## 2017-07-18 RX ORDER — SYRINGE AND NEEDLE,INSULIN,1ML 31GX15/64"
1 SYRINGE, EMPTY DISPOSABLE MISCELLANEOUS DAILY
Qty: 50 PEN NEEDLE | Refills: 11 | OUTPATIENT
Start: 2017-07-18

## 2017-07-18 NOTE — PROGRESS NOTES
[facility]               Sidney Myers MD        [address] 78 [phone] Fax [phone]           Patient Information  Date:7/18/2017  Name : Brent Hunter 52 y.o.     YOB: 1970         Referred by: Concetta Amor MD         Chief Complaint   Patient presents with    Diabetes       History of Present Illness: Brent Hunter is a 52 y.o. female here for fu  of  Type 1 Diabetes Mellitus. She has hx of  poor follow up,  non compliance   She has been intermittently taking insulin during the last one year. She was seen after a long gap in February, 2016, . dieticians have made many attempts to contact the patient. She is checking blood glucose six times     She is still forgetting to take the insulin and reportedly when she takes it as recommended, blood glucose are in hundreds. I have had very few blood glucose which are less than 150 and most of them are ranging from 200-400s. Admits to missing and forgetting. Now, she lost insurance and does not want to go on the pump now. Blood pressure is also very high. She complains of tiredness. Wt Readings from Last 3 Encounters:   07/18/17 118 lb (53.5 kg)   05/16/17 125 lb 9.6 oz (57 kg)   02/13/17 124 lb 3.2 oz (56.3 kg)       BP Readings from Last 3 Encounters:   07/18/17 (!) 193/104   05/16/17 (!) 191/96   02/13/17 138/75           Past Medical History:   Diagnosis Date    Anxiety disorder     Bell's palsy     Diabetes mellitus     Headache     Neuropathic pain     Ventricular septal defect, single     heart murmur     Current Outpatient Prescriptions   Medication Sig    bumetanide (BUMEX) 1 mg tablet TAKE 1 TABLET BY MOUTH EVERY DAY    insulin detemir (LEVEMIR) 100 unit/mL injection 20 Units by SubCUTAneous route daily (with dinner).  Stop Lantus    insulin lispro (HUMALOG) 100 unit/mL kwikpen Inject 1 unit for 12 grams of carbs w/ SSI Max units daily: 30    lisinopril (PRINIVIL, ZESTRIL) 20 mg tablet Take 1 Tab by mouth daily.  Lancets misc Use to test blood glucose 4 times daily    Insulin Syringe-Needle U-100 0.3 mL 30 gauge x 1/2\" syrg Use as directed to inject Lantus daily. Dx code E11.65    alcohol swabs padm Use to check blood sugars 4 times daily. E11.65    Lancing Device misc Use to check blood sugars 4 times daily. E11.65    Insulin Needles, Disposable, (VE PEN NEEDLE) 32 gauge x 5/32\" ndle Use with Humalog 3 times daily Dx Code: E11.65    insulin lispro (HUMALOG KWIKPEN) 200 unit/mL (3 mL) inpn Dispense as sample per Dr. Mindy Fuller    glucose blood VI test strips (FREESTYLE LITE STRIPS) strip Use to test blood glucose 6-10 times daily Dx Code: E11.65    Blood-Glucose Meter (FREESTYLE LITE METER) monitoring kit Use to test blood glucose 4 times daily    ferrous sulfate 325 mg (65 mg iron) tablet Take  by mouth Daily (before breakfast).  cyanocobalamin (VITAMIN B-12) 1,000 mcg tablet Take 1 Tab by mouth daily.  folic acid (FOLVITE) 1 mg tablet Take 1 Tab by mouth daily.  loratadine (CLARITIN) 10 mg tablet Take 10 mg by mouth daily as needed.  MULTIVITAMIN PO Take 1 Tab by mouth daily.  metoprolol tartrate (LOPRESSOR) 25 mg tablet Take 0.5 Tabs by mouth two (2) times a day.  busPIRone (BUSPAR) 5 mg tablet Take 1 Tab by mouth nightly. No current facility-administered medications for this visit.       Allergies   Allergen Reactions    Other Food Swelling     Cinnamon    Sulfa (Sulfonamide Antibiotics) Hives         Review of Systems:  - Constitutional Symptoms: no fevers, no chills, no weight loss  - Eyes: no blurry vision no double vision  - Gastrointestinal: no dysphagia no  abdominal pain  - Musculoskeletal: no joint pains + weakness  - Integumentary: no rashes  - Neurological: +  numbness, tingling, no  headaches  -     Physical Examination:   Blood pressure (!) 193/104, pulse 95, temperature 99 °F (37.2 °C), temperature source Oral, resp. rate 16, height 5' 3\" (1.6 m), weight 118 lb (53.5 kg), SpO2 100 %. Estimated body mass index is 20.9 kg/(m^2) as calculated from the following:    Height as of this encounter: 5' 3\" (1.6 m). -   Weight as of this encounter: 118 lb (53.5 kg). - General: pleasant, no distress, good eye contact  - HEENT: no pallor, no periorbital edema, EOMI  - Neck: supple,   - Cardiovascular: regular, normal rate, normal S1 and S2, murmur  - Respiratory: clear to auscultation bilaterally  - Gastrointestinal: soft, nontender, nondistended,  BS +  - Musculoskeletal: no acanthosis nigricans,no edema,   - Neurological:alert and oriented  - Psychiatric: normal mood and affect  - Skin: color, texture, turgor normal.       Data Reviewed:     [] Glucose records reviewed. [] See glucose records for details (to be scanned). [] A1C  [] Reviewed labs      Assessment/Plan:     No diagnosis found. 1. Type 1 Diabetes Mellitus   Lab Results   Component Value Date/Time    Hemoglobin A1c 12.8 04/01/2010 05:00 AM    Hemoglobin A1c (POC) 11.8 05/16/2017 03:51 PM    She has poorly controlled diabetes on basal bolus regimen. C peptide < 0.1 SUSHANT positive glu was 250   Lantus 20  units,Carb ratio is 1 for every 12 grams, nutrition class/carb counting classes   Correction: Humalog 1 unit for every 50 mg of glucose over 120. Apps discussed for carb counting    She is overwhelmed with the disease, discussed again at length with the patient to talk to  nutritionist to help with the carb counting. She was contacted by the dietitian several times, but was unsuccessful. Small carb meals, 4 units, moderate carb meals, 5 units, and high carb, 6-7 units. She is at a very high risk for DKA, hyperglycemia. There were several blood glucoses which were in the 500s. She sometimes is not taking insulin as she is afraid of hypoglycemia. Severe hypertension. No chest pain or shortness of breath. Lisinopril was added. 2.   HTN : Lisinopril   Risk for CVA    3 Peripheral neuropathy :She has neuropathy symptoms which is related to poor glycemic control. Discussed same with the patient and good glycemic control is the goal.  Also, discussed complications. She is at a very high risk for diabetic ketoacidosis and CVA given high blood pressure. Compliance discussed. Given samples to offset the cost but I can only help to a certain extent. Discussed setting alarms on the phone to help remind her. Patient Instructions   Check blood sugars before meals  and at bedtime. Low blood glucose is less than 70     Maintain the log and bring it all your appointments    If the bedtime sugars are less than 100 ,eat a 15 gm snack. Lantus or Levemir insulin 20 units at dinner     You need Humalog 1 unit for 12 grams of carbs  -  For eg if you eat 60 grams for a meal , divide it by 12 which is 5 units of Humalog is what you need for that meal or   Small carb meal 4 units of insulin , moderate carb meal 5 units and bigger carb meals 6 - 7  units     Additional  Humalog or Apidra with meals for high blood sugars     120 - 170 mg               1 unit   171 -220 mg   2 units   221- 270 mg   3 units   271-320 mg   4 units   321-370 mg   5 units   371-420 mg              6 units  421- 470 mg              7 units   471- 520 mg               8 units  521 - 570 mg              9 units  571 - 620 mg   10 units           Follow-up Disposition:  Return in about 4 months (around 11/18/2017). Thank you for allowing me to participate in the care of this patient.     Kymberly Orozco MD      At high risk for complications

## 2017-07-18 NOTE — MR AVS SNAPSHOT
Visit Information Date & Time Provider Department Dept. Phone Encounter #  
 7/18/2017  3:15 PM Khoi Mcgee MD [facility] [phone] [id number] Follow-up Instructions Return in about 4 months (around 11/18/2017). Upcoming Health Maintenance Date Due  
 LIPID PANEL Q1 1970 FOOT EXAM Q1 1/2/1980 MICROALBUMIN Q1 1/2/1980 EYE EXAM RETINAL OR DILATED Q1 1/2/1980 Pneumococcal 19-64 Medium Risk (1 of 1 - PPSV23) 1/2/1989 DTaP/Tdap/Td series (1 - Tdap) 1/2/1991 PAP AKA CERVICAL CYTOLOGY 1/2/1991 INFLUENZA AGE 9 TO ADULT 8/1/2017 HEMOGLOBIN A1C Q6M 11/16/2017 Allergies as of 7/18/2017  Review Complete On: 7/18/2017 By: Khoi Mcgee MD  
  
 Severity Noted Reaction Type Reactions Other Food  12/07/2015    Swelling Estefany Glance Sulfa (Sulfonamide Antibiotics)  05/27/2011    Hives Current Immunizations  Never Reviewed No immunizations on file. Not reviewed this visit Vitals BP Pulse Temp Resp Height(growth percentile) Weight(growth percentile) (!) 193/104 (BP 1 Location: Right arm, BP Patient Position: Sitting) 95 99 °F (37.2 °C) (Oral) 16 5' 3\" (1.6 m) 118 lb (53.5 kg) SpO2 BMI OB Status Smoking Status 100% 20.9 kg/m2 Having regular periods Former Smoker Vitals History BMI and BSA Data Body Mass Index Body Surface Area  
 20.9 kg/m 2 1.54 m 2 Preferred Pharmacy Pharmacy Name Phone [id number], [address] [place] [address] [phone] Your Updated Medication List  
  
   
This list is accurate as of: 7/18/17  3:51 PM.  Always use your most recent med list.  
  
  
  
  
 alcohol swabs Padm Use to check blood sugars 4 times daily. E11.65 Blood-Glucose Meter monitoring kit Commonly known as:  FREESTYLE LITE METER Use to test blood glucose 4 times daily  
  
 bumetanide 1 mg tablet Commonly known as:  Ange Gunn TAKE 1 TABLET BY MOUTH EVERY DAY  
  
 busPIRone 5 mg tablet Commonly known as:  BUSPAR Take 1 Tab by mouth nightly. CLARITIN 10 mg tablet Generic drug:  loratadine Take 10 mg by mouth daily as needed. cyanocobalamin 1,000 mcg tablet Commonly known as:  VITAMIN B-12 Take 1 Tab by mouth daily. ferrous sulfate 325 mg (65 mg iron) tablet Take  by mouth Daily (before breakfast). folic acid 1 mg tablet Commonly known as:  Google Take 1 Tab by mouth daily. glucose blood VI test strips strip Commonly known as:  FREESTYLE LITE STRIPS Use to test blood glucose 6-10 times daily Dx Code: E11.65  
  
 insulin detemir 100 unit/mL injection Commonly known as:  LEVEMIR  
20 Units by SubCUTAneous route daily (with dinner). Stop Lantus * insulin lispro 200 unit/mL (3 mL) Inpn Commonly known as:  HumaLOG KwikPen Dispense as sample per Dr. Leila Florence * insulin lispro 100 unit/mL kwikpen Commonly known as:  HUMALOG Inject 1 unit for 12 grams of carbs w/ SSI Max units daily: 30 Insulin Needles (Disposable) 32 gauge x 5/32\" Ndle Commonly known as:  Randee Pen Needle Use with Humalog 3 times daily Dx Code: E11.65 Insulin Syringe-Needle U-100 0.3 mL 30 gauge x 1/2\" Syrg Use as directed to inject Lantus daily. Dx code E11.65 Lancets Misc Use to test blood glucose 4 times daily Lancing Device Misc Use to check blood sugars 4 times daily. E11.65  
  
 lisinopril 20 mg tablet Commonly known as:  John Littler Take 1 Tab by mouth daily. metoprolol tartrate 25 mg tablet Commonly known as:  LOPRESSOR Take 0.5 Tabs by mouth two (2) times a day. MULTIVITAMIN PO Take 1 Tab by mouth daily. * Notice: This list has 2 medication(s) that are the same as other medications prescribed for you. Read the directions carefully, and ask your doctor or other care provider to review them with you. Follow-up Instructions Return in about 4 months (around 11/18/2017). Patient Instructions Check blood sugars before meals  and at bedtime. Low blood glucose is less than 70 Maintain the log and bring it all your appointments If the bedtime sugars are less than 100 ,eat a 15 gm snack. Lantus or Levemir insulin 20 units at dinner You need Humalog 1 unit for 12 grams of carbs  -  For eg if you eat 60 grams for a meal , divide it by 12 which is 5 units of Humalog is what you need for that meal or Small carb meal 4 units of insulin , moderate carb meal 5 units and bigger carb meals 6 - 7  units Additional  Humalog or Apidra with meals for high blood sugars 120 - 170 mg               1 unit 171 -220 mg   2 units 221- 270 mg   3 units 271-320 mg   4 units 321-370 mg   5 units 371-420 mg              6 units 421- 470 mg              7 units 471- 520 mg               8 units 521 - 570 mg              9 units 571 - 620 mg   10 units Introducing [facility]! Jen Olivas introduces [employer] patient portal. Now you can access parts of your medical record, email your doctor's office, and request medication refills online. 1. In your internet browser, go to [email or web address] 2. Click on the First Time User? Click Here link in the Sign In box. You will see the New Member Sign Up page. 3. Enter your [employer] Access Code exactly as it appears below. You will not need to use this code after youve completed the sign-up process. If you do not sign up before the expiration date, you must request a new code. · [employer] Access Code: [email or web address] Expires: 8/14/2017  5:20 PM 
 
4. Enter the last four digits of your Social Security Number (xxxx) and Date of Birth (mm/dd/yyyy) as indicated and click Submit. You will be taken to the next sign-up page. 5. Create a [employer] ID.  This will be your [employer] login ID and cannot be changed, so think of one that is secure and easy to remember. 6. Create a [employer] password. You can change your password at any time. 7. Enter your Password Reset Question and Answer. This can be used at a later time if you forget your password. 8. Enter your e-mail address. You will receive e-mail notification when new information is available in [address] 9. Click Sign Up. You can now view and download portions of your medical record. 10. Click the Download Summary menu link to download a portable copy of your medical information. If you have questions, please visit the Frequently Asked Questions section of the [employer] website. Remember, [employer] is NOT to be used for urgent needs. For medical emergencies, dial 911. Now available from your iPhone and Android! Please provide this summary of care documentation to your next provider. Your primary care clinician is listed as Owen Romero. If you have any questions after today's visit, please call [phone].

## 2017-07-19 LAB
ALBUMIN SERPL-MCNC: 3.3 G/DL (ref 3.5–5.5)
ALBUMIN/CREAT UR: 3843.4 MG/G CREAT (ref 0–30)
ALBUMIN/GLOB SERPL: 1.1 {RATIO} (ref 1.2–2.2)
ALP SERPL-CCNC: 101 IU/L (ref 39–117)
ALT SERPL-CCNC: 12 IU/L (ref 0–32)
AST SERPL-CCNC: 16 IU/L (ref 0–40)
BASOPHILS # BLD AUTO: 0 X10E3/UL (ref 0–0.2)
BASOPHILS NFR BLD AUTO: 1 %
BILIRUB SERPL-MCNC: 1.1 MG/DL (ref 0–1.2)
BUN SERPL-MCNC: 16 MG/DL (ref 6–24)
BUN/CREAT SERPL: 15 (ref 9–23)
CALCIUM SERPL-MCNC: 9.4 MG/DL (ref 8.7–10.2)
CHLORIDE SERPL-SCNC: 91 MMOL/L (ref 96–106)
CO2 SERPL-SCNC: 24 MMOL/L (ref 18–29)
CREAT SERPL-MCNC: 1.05 MG/DL (ref 0.57–1)
CREAT UR-MCNC: 95.2 MG/DL
EOSINOPHIL # BLD AUTO: 0.1 X10E3/UL (ref 0–0.4)
EOSINOPHIL NFR BLD AUTO: 2 %
ERYTHROCYTE [DISTWIDTH] IN BLOOD BY AUTOMATED COUNT: 13.7 % (ref 12.3–15.4)
GLOBULIN SER CALC-MCNC: 3.1 G/DL (ref 1.5–4.5)
GLUCOSE SERPL-MCNC: 371 MG/DL (ref 65–99)
HCT VFR BLD AUTO: 36.3 % (ref 34–46.6)
HGB BLD-MCNC: 11.8 G/DL (ref 11.1–15.9)
IMM GRANULOCYTES # BLD: 0 X10E3/UL (ref 0–0.1)
IMM GRANULOCYTES NFR BLD: 0 %
LDLC SERPL DIRECT ASSAY-MCNC: 135 MG/DL (ref 0–99)
LYMPHOCYTES # BLD AUTO: 1.3 X10E3/UL (ref 0.7–3.1)
LYMPHOCYTES NFR BLD AUTO: 19 %
MCH RBC QN AUTO: 29 PG (ref 26.6–33)
MCHC RBC AUTO-ENTMCNC: 32.5 G/DL (ref 31.5–35.7)
MCV RBC AUTO: 89 FL (ref 79–97)
MICROALBUMIN UR-MCNC: 3658.9 UG/ML
MONOCYTES # BLD AUTO: 0.4 X10E3/UL (ref 0.1–0.9)
MONOCYTES NFR BLD AUTO: 6 %
NEUTROPHILS # BLD AUTO: 5.1 X10E3/UL (ref 1.4–7)
NEUTROPHILS NFR BLD AUTO: 72 %
PLATELET # BLD AUTO: 250 X10E3/UL (ref 150–379)
POTASSIUM SERPL-SCNC: 5.3 MMOL/L (ref 3.5–5.2)
PROT SERPL-MCNC: 6.4 G/DL (ref 6–8.5)
RBC # BLD AUTO: 4.07 X10E6/UL (ref 3.77–5.28)
SODIUM SERPL-SCNC: 132 MMOL/L (ref 134–144)
TSH SERPL DL<=0.005 MIU/L-ACNC: 2.79 UIU/ML (ref 0.45–4.5)
WBC # BLD AUTO: 6.9 X10E3/UL (ref 3.4–10.8)

## 2017-07-23 NOTE — PROGRESS NOTES
She is leaking excess protein in the urine which indicates kidney damage and I would like her to see kidney doctor     It could be due to diabetes alone but have to make sure that no other cause    Refer to Nephrology - Dr Ayanna Garcia group  Or Dr Nina rivera

## 2017-07-24 ENCOUNTER — TELEPHONE (OUTPATIENT)
Dept: ENDOCRINOLOGY | Age: 47
End: 2017-07-24

## 2017-07-24 NOTE — TELEPHONE ENCOUNTER
Informed pt of results below. Pt states she needs the appt with kidney specialist this week be cause she is about to loose her insurance.  Please advise      ----- Message from Evelin Devi MD sent at 7/22/2017 10:15 PM EDT -----  She is leaking excess protein in the urine which indicates kidney damage and I would like her to see kidney doctor     It could be due to diabetes alone but have to make sure that no other cause    Refer to Nephrology - Dr Ayanna Garcia group  Or Dr Nina Duarte group

## 2017-07-26 DIAGNOSIS — R80.9 PROTEINURIA, UNSPECIFIED TYPE: Primary | ICD-10-CM

## 2017-07-26 NOTE — TELEPHONE ENCOUNTER
Pt called stating she was given a name of a kidney specialist in her network by her insurance company. She states she would like an appt with Dr Miachel Apley [phone]. Pt states she needs the appointment this week or no later thatn Monday as her insurance will run out. If they have weekend hours she said she can do that as well. Please advise.

## 2017-08-16 ENCOUNTER — TELEPHONE (OUTPATIENT)
Dept: ENDOCRINOLOGY | Age: 47
End: 2017-08-16

## 2017-08-16 NOTE — LETTER
8/29/2017 8:21 AM 
 
Ms. Huber Menon 
[address] [address] Dear Ms GRANT [facility], Our office has tried to return your call in reference to lab work and your last office visit. When you have a moment, please call the office at [phone]. Sincerely, 
 
 
Sarita Robles LPN 
CDE Nurse for Maksim Vasquez MD